# Patient Record
Sex: MALE | Race: WHITE
[De-identification: names, ages, dates, MRNs, and addresses within clinical notes are randomized per-mention and may not be internally consistent; named-entity substitution may affect disease eponyms.]

---

## 2020-08-06 ENCOUNTER — HOSPITAL ENCOUNTER (EMERGENCY)
Dept: HOSPITAL 65 - ER | Age: 40
Discharge: HOME | End: 2020-08-06
Payer: COMMERCIAL

## 2020-08-06 VITALS — SYSTOLIC BLOOD PRESSURE: 131 MMHG | DIASTOLIC BLOOD PRESSURE: 66 MMHG

## 2020-08-06 VITALS — SYSTOLIC BLOOD PRESSURE: 167 MMHG | DIASTOLIC BLOOD PRESSURE: 124 MMHG

## 2020-08-06 VITALS — HEIGHT: 73 IN | BODY MASS INDEX: 26.51 KG/M2 | WEIGHT: 200 LBS

## 2020-08-06 VITALS — DIASTOLIC BLOOD PRESSURE: 69 MMHG | SYSTOLIC BLOOD PRESSURE: 156 MMHG

## 2020-08-06 VITALS — SYSTOLIC BLOOD PRESSURE: 138 MMHG | DIASTOLIC BLOOD PRESSURE: 81 MMHG

## 2020-08-06 DIAGNOSIS — R06.02: ICD-10-CM

## 2020-08-06 DIAGNOSIS — F17.210: ICD-10-CM

## 2020-08-06 DIAGNOSIS — R11.2: ICD-10-CM

## 2020-08-06 DIAGNOSIS — R07.9: Primary | ICD-10-CM

## 2020-08-06 LAB
ALP INTEST CFR SERPL: 82 U/L (ref 50–136)
ALT SERPL-CCNC: 24 U/L (ref 12–78)
AST SERPL-CCNC: 21 U/L (ref 0–35)
BASOPHILS # BLD AUTO: 0 10^3/UL (ref 0–0.1)
BASOPHILS NFR BLD AUTO: 0.4 % (ref 0–0.2)
CALCIUM SERPL-MCNC: 8.9 MG/DL (ref 8.4–10.5)
CO2 BLDA-SCNC: 21 MMOL/L (ref 20–32)
EOSINOPHIL # BLD AUTO: 0.2 10^3/UL (ref 0–0.2)
EOSINOPHIL NFR BLD AUTO: 2.7 % (ref 0–5)
ERYTHROCYTE [DISTWIDTH] IN BLOOD BY AUTOMATED COUNT: 13.6 % (ref 11.5–14.5)
GLUCOSE PRE 100 G GLC PO SERPL-MCNC: 86 MG/DL (ref 70–110)
LYMPHOCYTES # BLD AUTO: 2.22 10^3/UL1 (ref 1–4.8)
LYMPHOCYTES NFR BLD AUTO: 26.1 % (ref 24–44)
MCH RBC QN AUTO: 28.2 PG (ref 26–34)
MONOCYTES # BLD AUTO: 1 10^3/UL (ref 0.3–0.8)
MONOCYTES NFR BLD AUTO: 11.6 % (ref 5–12)
NEUTROPHILS # BLD AUTO: 5 10^3/UL (ref 1.8–7.7)
NEUTROPHILS NFR BLD AUTO: 59.1 % (ref 41–85)
PLATELET # BLD AUTO: 229 10^3/UL (ref 150–400)

## 2020-08-06 PROCEDURE — 82550 ASSAY OF CK (CPK): CPT

## 2020-08-06 PROCEDURE — 82553 CREATINE MB FRACTION: CPT

## 2020-08-06 PROCEDURE — 85610 PROTHROMBIN TIME: CPT

## 2020-08-06 PROCEDURE — 83880 ASSAY OF NATRIURETIC PEPTIDE: CPT

## 2020-08-06 PROCEDURE — 93005 ELECTROCARDIOGRAM TRACING: CPT

## 2020-08-06 PROCEDURE — 99285 EMERGENCY DEPT VISIT HI MDM: CPT

## 2020-08-06 PROCEDURE — 84484 ASSAY OF TROPONIN QUANT: CPT

## 2020-08-06 PROCEDURE — 86677 HELICOBACTER PYLORI ANTIBODY: CPT

## 2020-08-06 PROCEDURE — 96360 HYDRATION IV INFUSION INIT: CPT

## 2020-08-06 PROCEDURE — 36415 COLL VENOUS BLD VENIPUNCTURE: CPT

## 2020-08-06 PROCEDURE — 85730 THROMBOPLASTIN TIME PARTIAL: CPT

## 2020-08-06 PROCEDURE — 85025 COMPLETE CBC W/AUTO DIFF WBC: CPT

## 2020-08-06 PROCEDURE — 85379 FIBRIN DEGRADATION QUANT: CPT

## 2020-08-06 PROCEDURE — 80053 COMPREHEN METABOLIC PANEL: CPT

## 2020-08-06 PROCEDURE — 71045 X-RAY EXAM CHEST 1 VIEW: CPT

## 2020-08-06 NOTE — DIREP
PROCEDURE:CHEST 1 VIEW

 

COMPARISON:None.

 

INDICATIONS:chest pain

 

FINDINGS:

LUNGS/PLEURA:No significant pulmonary parenchymal abnormalities. No effusions.

VASCULATURE:Normal.  Unremarkable pulmonary vasculature.

CARDIAC:Normal.  No cardiac silhouette abnormality or cardiomegaly. 

MEDIASTINUM:Normal.  No visible mass or adenopathy. 

BONES:Normal.  No fracture or visible bony lesion. 

OTHER:Negative.  

 

CONCLUSION:No acute disease.  

 

 

 

Dictated by: Shawn Dooley MD on 08/06/2020 at 03:07 PM     

Electronically Signed By: Shawn Dooley MD on 08/06/2020 at 03:07 PM

## 2020-08-06 NOTE — PCM.EKG
[facility]

                                       

Test Date:    2020               Test Time:    14:25:57

Pat Name:     BANDAR MARTINEZ            Department:   

Patient ID:   [facility]-[id number]          Room:          

Gender:       M                        Technician:   

:          1980               Requested By: AMINATA HOOVER

Order Number: [id number]           Reading MD:   Elizabeth Hoovre

                                 Measurements

Intervals                              Axis          

Rate:         80                       P:            58

KS:           170                      QRS:          90

QRSD:         98                       T:            55

QT:           359                                    

QTc:          415                                    

                           Interpretive Statements

Sinus rhythm

Borderline right axis deviation

ST elev, probable normal early repol pattern

No previous ECG available for comparison

Electronically Signed On 2020 7:01:12 CDT by Elizabeth Hoover



Please click the below link to view image of tracing.

## 2020-08-06 NOTE — ER.PDOC
General


Chief Complaint:  Requesting Medical Care


Stated Complaint:  CHEST PAIN/SOB


Time seen by MD:  14:45


Source:  patient


Exam Limitations:  no limitations





History of Present Illness


Initial Comments


chest pain, onset with exertion, radiates to left arm, described as 

heavy/pressure, + SOB, + diaphoresis, + nausea; father had MI at 37 YOA


Timing/Duration:  1 hour


Severity/Quality:  moderate (6*7/10), severe, aching, dull, pressure


Radiation:  arms (left)


Activities at Onset:  activity/exertion


Prior CP/Workup:  No Prior Chest Pain, No Prior Cardiac Workup


Nitro Today/Relief:  No Nitro Taken Today


Aspirin Today:  No Aspirin Today


Associated Symptoms:  diaphoresis, nausea/vomiting, shortness of breath





Past Medical History


Medical History:  no pertinent history


Surgical History:  no surgical history





Family History


Significant Family History:  heart disease (father had first MI at 37 YOA)





Social History


Smoking:  cigarettes


Drug Use:  none





Constitutional:  diaphoresis


EENTM:  no symptoms reported


Respiratory:  shortness of breath


Cardiovascular:  chest pain


Gastrointestinal:  nausea


Musculoskeletal:  no symptoms reported





Physical Exam


General Appearance:  WD/WN, Anxious


Respiratory:  no respiratory distress, no accessory muscle use, wheezing 

(wheezing on left)


Cardiovascular:  Regular Rate, Rhythm


Gastrointestinal:  Normal Bowel Sounds, No Pulsatile Mass, Non Tender


Extremities:  No Pedal Edema, No Calf Tenderness


Neurologic/Psychiatric:  Alert, Normal Mood/Affect, Oriented x 3


Skin:  Normal Color, Warm/Dry





Results/Orders


Results/Orders





Orders - AMINATA HOOVER DO


Cbc With Auto Diff (8/6/20 14:42)


Comprehensive Metabolic Panel (8/6/20 14:42)


Creatine Kinase (8/6/20 14:42)


Creatine Kinase Mb (8/6/20 14:42)


Troponin I (8/6/20 14:42)


Probnp    B-Type Np (8/6/20 14:42)


PT (8/6/20 14:42)


Partial Thromboplastin Time. (8/6/20 14:42)


Helicobacter Pylori (8/6/20 14:42)


D-Dimer (8/6/20 14:42)


Xr Chest 1v (8/6/20 14:42)


Ekg-Routine (8/6/20 14:42)


Aspirin (Aspirin) (8/6/20 15:00)


Nitroglycerin (Nitrostat) (8/6/20 15:00)


Saline Lock (8/6/20 14:42)


0.9 % Sodium Chloride (Ns 1000ml) (8/6/20 14:42)





Progress


Progress


Cardiac enzymes negative.  2nd troponin, 2 hours later normal.  EKG normal.





EKG/XRAY/CT/US


EKG:  NSR, no ST T wave changes


XRAY:  chest (clear)





Departure


Time of Disposition:  17:44


Disposition:  01 HOME, SELF-CARE


Impression:  


   Primary Impression:  


   Chest pain


Condition:  Improved


Patient Instructions:  Chest Pain (Nonspecific)


Referrals:  


PCP,UNKNOWN (PCP)


PRIMARY CARE PROVIDER








SAMMY ESPOSITO DO





Additional Instructions:  


Follow up with Dr. Esposito next week for further cardiac evaluation.  Return to 

the ER if your pain returns, difficulty breathing, vomiting, fainting, or for 

any other emergent concerns.  Take an 81 mg aspirin daily.  Stop smoking.


Duration or Time Spent with Pa:  25





Problem Qualifiers








   Primary Impression:  


   Chest pain


   Chest pain type:  intercostal pain  Qualified Codes:  R07.82 - Intercostal 

   pain








AMINATA HOOVER DO             Aug 6, 2020 14:48